# Patient Record
Sex: FEMALE | Race: BLACK OR AFRICAN AMERICAN
[De-identification: names, ages, dates, MRNs, and addresses within clinical notes are randomized per-mention and may not be internally consistent; named-entity substitution may affect disease eponyms.]

---

## 2020-01-29 ENCOUNTER — HOSPITAL ENCOUNTER (INPATIENT)
Dept: HOSPITAL 61 - ER | Age: 50
LOS: 3 days | Discharge: HOME | DRG: 440 | End: 2020-02-01
Attending: FAMILY MEDICINE | Admitting: FAMILY MEDICINE
Payer: SELF-PAY

## 2020-01-29 VITALS — WEIGHT: 228.06 LBS | BODY MASS INDEX: 36.65 KG/M2 | HEIGHT: 66 IN

## 2020-01-29 DIAGNOSIS — I10: ICD-10-CM

## 2020-01-29 DIAGNOSIS — D25.9: ICD-10-CM

## 2020-01-29 DIAGNOSIS — Z86.718: ICD-10-CM

## 2020-01-29 DIAGNOSIS — N92.0: ICD-10-CM

## 2020-01-29 DIAGNOSIS — K85.20: Primary | ICD-10-CM

## 2020-01-29 DIAGNOSIS — E83.42: ICD-10-CM

## 2020-01-29 DIAGNOSIS — K76.0: ICD-10-CM

## 2020-01-29 DIAGNOSIS — Z79.01: ICD-10-CM

## 2020-01-29 DIAGNOSIS — D50.9: ICD-10-CM

## 2020-01-29 LAB
ALBUMIN SERPL-MCNC: 3.7 G/DL (ref 3.4–5)
ALBUMIN/GLOB SERPL: 0.8 {RATIO} (ref 1–1.7)
ALP SERPL-CCNC: 55 U/L (ref 46–116)
ALT SERPL-CCNC: 45 U/L (ref 14–59)
ANION GAP SERPL CALC-SCNC: 13 MMOL/L (ref 6–14)
ANISOCYTOSIS BLD QL SMEAR: (no result)
APTT PPP: YELLOW S
AST SERPL-CCNC: 90 U/L (ref 15–37)
BACTERIA #/AREA URNS HPF: 0 /HPF
BASOPHILS # BLD AUTO: 0.1 X10^3/UL (ref 0–0.2)
BASOPHILS NFR BLD: 1 % (ref 0–3)
BILIRUB SERPL-MCNC: 0.8 MG/DL (ref 0.2–1)
BILIRUB UR QL STRIP: NEGATIVE
BUN SERPL-MCNC: 10 MG/DL (ref 7–20)
BUN/CREAT SERPL: 14 (ref 6–20)
CALCIUM SERPL-MCNC: 9.5 MG/DL (ref 8.5–10.1)
CHLORIDE SERPL-SCNC: 99 MMOL/L (ref 98–107)
CO2 SERPL-SCNC: 24 MMOL/L (ref 21–32)
CREAT SERPL-MCNC: 0.7 MG/DL (ref 0.6–1)
EOSINOPHIL NFR BLD: 0.1 X10^3/UL (ref 0–0.7)
EOSINOPHIL NFR BLD: 1 % (ref 0–3)
ERYTHROCYTE [DISTWIDTH] IN BLOOD BY AUTOMATED COUNT: 20.9 % (ref 11.5–14.5)
FIBRINOGEN PPP-MCNC: CLEAR MG/DL
GFR SERPLBLD BASED ON 1.73 SQ M-ARVRAT: 107.6 ML/MIN
GLOBULIN SER-MCNC: 4.8 G/DL (ref 2.2–3.8)
GLUCOSE SERPL-MCNC: 140 MG/DL (ref 70–99)
HCT VFR BLD CALC: 33.7 % (ref 36–47)
HGB BLD-MCNC: 10.9 G/DL (ref 12–15.5)
HYPOCHROMIA BLD QL SMEAR: (no result)
LYMPHOCYTES # BLD: 0.9 X10^3/UL (ref 1–4.8)
LYMPHOCYTES NFR BLD AUTO: 14 % (ref 24–48)
MCH RBC QN AUTO: 23 PG (ref 25–35)
MCHC RBC AUTO-ENTMCNC: 32 G/DL (ref 31–37)
MCV RBC AUTO: 70 FL (ref 79–100)
MICROCYTES BLD QL SMEAR: (no result)
MONO #: 0.4 X10^3/UL (ref 0–1.1)
MONOCYTES NFR BLD: 6 % (ref 0–9)
NEUT #: 5.1 X10^3/UL (ref 1.8–7.7)
NEUTROPHILS NFR BLD AUTO: 78 % (ref 31–73)
NITRITE UR QL STRIP: NEGATIVE
PH UR STRIP: 6 [PH]
PLATELET # BLD AUTO: 278 X10^3/UL (ref 140–400)
PLATELET # BLD EST: ADEQUATE 10*3/UL
POIKILOCYTOSIS BLD QL SMEAR: SLIGHT
POTASSIUM SERPL-SCNC: 3.5 MMOL/L (ref 3.5–5.1)
PROT SERPL-MCNC: 8.5 G/DL (ref 6.4–8.2)
PROT UR STRIP-MCNC: (no result) MG/DL
RBC # BLD AUTO: 4.78 X10^6/UL (ref 3.5–5.4)
RBC #/AREA URNS HPF: 0 /HPF (ref 0–2)
SODIUM SERPL-SCNC: 136 MMOL/L (ref 136–145)
SQUAMOUS #/AREA URNS LPF: (no result) /LPF
UROBILINOGEN UR-MCNC: 2 MG/DL
WBC # BLD AUTO: 6.6 X10^3/UL (ref 4–11)
WBC #/AREA URNS HPF: (no result) /HPF (ref 0–4)

## 2020-01-29 PROCEDURE — 80053 COMPREHEN METABOLIC PANEL: CPT

## 2020-01-29 PROCEDURE — 82607 VITAMIN B-12: CPT

## 2020-01-29 PROCEDURE — 83550 IRON BINDING TEST: CPT

## 2020-01-29 PROCEDURE — 85025 COMPLETE CBC W/AUTO DIFF WBC: CPT

## 2020-01-29 PROCEDURE — 83735 ASSAY OF MAGNESIUM: CPT

## 2020-01-29 PROCEDURE — 93005 ELECTROCARDIOGRAM TRACING: CPT

## 2020-01-29 PROCEDURE — 83690 ASSAY OF LIPASE: CPT

## 2020-01-29 PROCEDURE — 74177 CT ABD & PELVIS W/CONTRAST: CPT

## 2020-01-29 PROCEDURE — 84484 ASSAY OF TROPONIN QUANT: CPT

## 2020-01-29 PROCEDURE — 76705 ECHO EXAM OF ABDOMEN: CPT

## 2020-01-29 PROCEDURE — 85018 HEMOGLOBIN: CPT

## 2020-01-29 PROCEDURE — 83605 ASSAY OF LACTIC ACID: CPT

## 2020-01-29 PROCEDURE — 82962 GLUCOSE BLOOD TEST: CPT

## 2020-01-29 PROCEDURE — 81001 URINALYSIS AUTO W/SCOPE: CPT

## 2020-01-29 PROCEDURE — 83540 ASSAY OF IRON: CPT

## 2020-01-29 PROCEDURE — 85014 HEMATOCRIT: CPT

## 2020-01-29 PROCEDURE — 84478 ASSAY OF TRIGLYCERIDES: CPT

## 2020-01-29 PROCEDURE — 36415 COLL VENOUS BLD VENIPUNCTURE: CPT

## 2020-01-29 PROCEDURE — G0378 HOSPITAL OBSERVATION PER HR: HCPCS

## 2020-01-29 PROCEDURE — 87086 URINE CULTURE/COLONY COUNT: CPT

## 2020-01-29 NOTE — PHYS DOC
Past Medical History


Past Medical History:  Anemia, DVT, Hypertension, Uterine Fibroids


Past Surgical History:  No Surgical History


Alcohol Use:  Occasionally


Drug Use:  None





Adult General


Chief Complaint


Chief Complaint:  ABDOMINAL PAIN





[facility]


HPI





49-year-old female presents to the emergency department with complaints of abdo

tam pain area patient describes pain onset approximately 3-4 days ago 

worsening today, she describes "excruciating. States the pain started in the 

epigastric region down the middle of her abdomen. She describes nausea as well 

as vomiting. Lasting approximately 3 days ago. Patient denies any bloating or 

distention on examination. She denies any fever or chills.  Patient does have a 

history of hypertension as well as fibroids.  Nothing makes her symptoms worse, 

nothing makes her symptoms better.





Review of Systems


Review of Systems





Constitutional: Denies fever or chills []


Respiratory: Denies cough or shortness of breath []


Cardiovascular: No additional information not addressed in HPI []


GI: + abdominal pain, nausea, vomiting, no bloody stools or diarrhea []


: Denies dysuria or hematuria []


Musculoskeletal: Denies back pain or joint pain []


Neurologic: Denies headache, focal weakness or sensory changes []





All other systems were reviewed and found to be within normal limits, except as 

documented in this note.





Current Medications


Current Medications





Current Medications








 Medications


  (Trade)  Dose


 Ordered  Sig/[facility]  Start Time


 Stop Time Status Last Admin


Dose Admin


 


 Info


  (CONTRAST GIVEN


 -- Rx MONITORING)  1 each  PRN DAILY  PRN  20 23:45


 20 23:44   





 


 Morphine Sulfate


  (Morphine


 Sulfate)  4 mg  1X  ONCE  20 23:30


 20 23:31 DC 20 23:24


4 MG


 


 Ondansetron HCl


  (Zofran)  4 mg  1X  ONCE  20 23:30


 20 23:31 DC 20 23:23


4 MG











Allergies


Allergies





Allergies








Coded Allergies Type Severity Reaction Last Updated Verified


 


  No Known Drug Allergies    14 No











Physical Exam


Physical Exam





Constitutional: Well developed, well nourished, mild distress 2/2 pain, non-

toxic appearance. []


HENT: Normocephalic, atraumatic, bilateral external ears normal, oropharynx 

moist, no oral exudates, nose normal. []


Eyes: PERRLA, EOMI, conjunctiva normal, no discharge. [] 


Cardiovascular:Heart rate regular rhythm, no murmur []


Lungs & Thorax:  Bilateral breath sounds clear to auscultation []


Abdomen: Bowel sounds normal, soft, TTP throughout, no masses, no pulsatile 

masses. [] 


Skin: Warm, dry, no erythema, no rash. [] 


Back: No tenderness, no CVA tenderness. [] 


Extremities: No tenderness, no edema. [] 


Neurologic: Alert and oriented X 3, no focal deficits noted. []


Psychologic: Affect normal, judgement normal, mood normal. []





Current Patient Data


Vital Signs





                                   Vital Signs








  Date Time  Temp Pulse Resp B/P (MAP) Pulse Ox O2 Delivery O2 Flow Rate FiO2


 


20 23:48  70 18 151/69 (96) 96 Room Air  


 


20 22:39 97.0       





 97.0       








Lab Values





                                Laboratory Tests








Test


 20


23:15 20


23:45


 


White Blood Count


 6.6 x10^3/uL


(4.0-11.0) 





 


Red Blood Count


 4.78 x10^6/uL


(3.50-5.40) 





 


Hemoglobin


 10.9 g/dL


(12.0-15.5)  L 





 


Hematocrit


 33.7 %


(36.0-47.0)  L 





 


Mean Corpuscular Volume


 70 fL ()


L 





 


Mean Corpuscular Hemoglobin


 23 pg (25-35)


L 





 


Mean Corpuscular Hemoglobin


Concent 32 g/dL


(31-37) 





 


Red Cell Distribution Width


 20.9 %


(11.5-14.5)  H 





 


Platelet Count


 278 x10^3/uL


(140-400) 





 


Neutrophils (%) (Auto) 78 % (31-73)  H 


 


Lymphocytes (%) (Auto) 14 % (24-48)  L 


 


Monocytes (%) (Auto) 6 % (0-9)   


 


Eosinophils (%) (Auto) 1 % (0-3)   


 


Basophils (%) (Auto) 1 % (0-3)   


 


Neutrophils # (Auto)


 5.1 x10^3/uL


(1.8-7.7) 





 


Lymphocytes # (Auto)


 0.9 x10^3/uL


(1.0-4.8)  L 





 


Monocytes # (Auto)


 0.4 x10^3/uL


(0.0-1.1) 





 


Eosinophils # (Auto)


 0.1 x10^3/uL


(0.0-0.7) 





 


Basophils # (Auto)


 0.1 x10^3/uL


(0.0-0.2) 





 


Platelet Estimate


 Adequate


(ADEQUATE) 





 


Hypochromasia Mod   


 


Poikilocytosis Slight   


 


Anisocytosis Mod   


 


Microcytosis Mod   


 


Urine Collection Type Unknown   


 


Urine Color Yellow   


 


Urine Clarity Clear   


 


Urine pH 6.0   


 


Urine Specific Gravity 1.025   


 


Urine Protein


 Trace mg/dL


(NEG-TRACE) 





 


Urine Glucose (UA)


 Negative mg/dL


(NEG) 





 


Urine Ketones (Stick)


 15 mg/dL (NEG)


 





 


Urine Blood


 Negative (NEG)


 





 


Urine Nitrite


 Negative (NEG)


 





 


Urine Bilirubin


 Negative (NEG)


 





 


Urine Urobilinogen Dipstick


 2.0 mg/dL (0.2


mg/dL) 





 


Urine Leukocyte Esterase Trace (NEG)   


 


Urine RBC 0 /HPF (0-2)   


 


Urine WBC


 Occ /HPF (0-4)


 





 


Urine Squamous Epithelial


Cells Few /LPF  


 





 


Urine Bacteria


 0 /HPF (0-FEW)


 





 


Urine Mucus Mod /LPF   


 


Sodium Level


 136 mmol/L


(136-145) 





 


Potassium Level


 3.5 mmol/L


(3.5-5.1) 





 


Chloride Level


 99 mmol/L


() 





 


Carbon Dioxide Level


 24 mmol/L


(21-32) 





 


Anion Gap 13 (6-14)   


 


Blood Urea Nitrogen


 10 mg/dL


(7-20) 





 


Creatinine


 0.7 mg/dL


(0.6-1.0) 





 


Estimated GFR


(Cockcroft-Gault) 107.6  


 





 


BUN/Creatinine Ratio 14 (6-20)   


 


Glucose Level


 140 mg/dL


(70-99)  H 





 


Calcium Level


 9.5 mg/dL


(8.5-10.1) 





 


Total Bilirubin


 0.8 mg/dL


(0.2-1.0) 





 


Aspartate Amino Transferase


(AST) 90 U/L (15-37)


H 





 


Alanine Aminotransferase (ALT)


 45 U/L (14-59)


 





 


Alkaline Phosphatase


 55 U/L


() 





 


Troponin I Quantitative


 < 0.017 ng/mL


(0.000-0.055) 





 


Total Protein


 8.5 g/dL


(6.4-8.2)  H 





 


Albumin


 3.7 g/dL


(3.4-5.0) 





 


Albumin/Globulin Ratio


 0.8 (1.0-1.7)


L 





 


Lipase


 78223 U/L


()  H 





 


Lactic Acid Level


 


 1.3 mmol/L


(0.4-2.0)





                                Laboratory Tests


20 23:15








                                Laboratory Tests


20 23:15











EKG


EKG


EKG reviewed interpretation time 2251, left axis deviation, normal sinus rhythm 

no evidence of ST elevation[]





Radiology/Procedures


Radiology/Procedures


[facility]


                    [address] [place] [address]


                                 [phone]


                                        


                                 IMAGING REPORT





                                     Signed





PATIENT: LORENE KIRKPATRICK  ACCOUNT: [id number]     MRN#: [id number]


: 1970           LOCATION: [address] [place] [address]         AGE: 49


SEX: F                    EXAM DT: 20         ACCESSION#: [id number]


STATUS: ADM IN            ORD. PHYSICIAN: DANITA GONZALEZ MD


REASON: abdominal pain, non-specific, generalized, OMNI 300, 75 ML IV


PROCEDURE: CT ABD PELV W/ IV CONTRST ONLY





EXAM: CT Abdomen and Pelvis with IV contrast


 


CLINICAL HISTORY: Generalized abdominal pain.


 


COMPARISON: none


 


TECHNIQUE: Helical CT of the abdomen and pelvis was performed following 


the administration of IV contrast. Axial, coronal and sagittal reformatted


images were generated.


 


---PQRS compliance statement - One or more of the following individualized


dose reduction techniques were utilized for this study:


1.  Automated exposure control


2.  Adjustment of the mA and/or kV according to patient size


3.  Use of iterative reconstruction technique---


 


FINDINGS:


Lower chest: Linear opacities in the lower lobes and lingula likely 


scarring/atelectasis.


 


Abdomen and pelvis:


Liver and biliary system: Hepatic hypoattenuation likely fatty liver. 


Regions of low-attenuation along the falciform ligament and segment IVb, 


nonspecific and although may represent superimposed focal fatty 


infiltration, underlying mass is not excluded.  Gallbladder is normal. No 


biliary ductal dilatation.


 


Spleen: Unremarkable


 


Pancreas: Diffuse edema about the pancreatic tail consistent with acute 


pancreatitis. No discrete loculated fluid collection is seen. Symmetric 


homogenous pancreatic enhancement without evidence for ischemia/necrosis.


 


Adrenal glands: Unremarkable


 


Kidneys: Symmetric nephrograms. No focal renal lesion. No hydronephrosis. 


No hydroureter.


 


Lymph nodes/retroperitoneum: No abdominal pelvic lymphadenopathy. 


 


Vessels: Aorta is normal in caliber. Intermittent atherosclerotic 


calcifications are seen. Splenic vein is patent


 


Bowel/Peritoneal cavity: Moderate colonic stool content is seen. Appendix 


is normal. No small or large bowel dilatation. No evidence of bowel 


obstruction.  


 


No abdominal or pelvic ascites. Marked enlargement of the uterus, 


measuring approximately 13.5 x 10.6 x 19.6 cm. This is likely from 


numerous underlying fibroids including exophytic fibroids. The largest 


uterus abuts the adjacent external iliac artery and vein.


 


Abdominal wall: Small fat-containing periumbilical hernia is seen. 


 


Bladder: Bladder is decompressed.


 


Bones: Mild leftward curvature of the lumbar spine.


 


IMPRESSION:


1.  Marked edema about the pancreas consistent with acute pancreatitis. No


CT evidence for pancreatic ischemia/necrosis or associated loculated 


peripancreatic collection.


2.  Diffuse hepatic low-attenuation along the falx from ligament and 


segment IVb, although this may be related to focal fatty infiltration, 


underlying hepatic lesion is not excluded. This can be further delineated 


by ultrasound or multiphase MRI/CT.


3.  Markedly enlarged appearance of the uterus, favored to be sequelae of 


fibroid uterus 


 


Electronically signed by: Jose Angel Hart MD (2020 1:20 AM) [facility]














DICTATED and SIGNED BY:     JOSE ANGEL HART MD


DATE:     20


[]





Course & Med Decision Making


Course & Med Decision Making


Pertinent Labs and Imaging studies reviewed. (See chart for details)





[]49-year-old female presents to the emergency department with complaints of 

abdominal pain area patient describes pain onset approximately 3-4 days ago 

worsening today, she describes "excruciating. States the pain started in the 

epigastric region down the middle of her abdomen. She describes nausea as well 

as vomiting. Lasting approximately 3 days ago. Patient denies any bloating or 

distention on examination. She denies any fever or chills.  Patient does have a 

history of hypertension as well as fibroids.  Nothing makes her symptoms worse, 

nothing makes her symptoms better.  





Labs reviewed/Imaging reviewed


Evidence of acute pancreatitis on labs, lipase 13k


UAD negative for acute process


Morphine 4mg/Zofran 4mg IV 


IVF x 1 liter


Plan admit with further hospitalist evaluation


Discussed findings with patient/family at bedside





Dragon Disclaimer


Dragon Disclaimer


This electronic medical record was generated, in whole or in part, using a voice

 recognition dictation system.





Departure


Departure


Impression:  


   Primary Impression:  


   Acute pancreatitis


   Additional Impression:  


   Nausea & vomiting


Disposition:   ADMITTED AS INPATIENT


Condition:  STABLE


Referrals:  


NO PCP (PCP)


Critical Care Time


 Critical care time was 35 minutes exclusive of procedures.





Problem Qualifiers








   Primary Impression:  


   Acute pancreatitis


   Pancreatitis type:  unspecified pancreatitis type  Acute pancreatitis 

   complication:  unspecified  Qualified Codes:  K85.90 - Acute pancreatitis 

   without necrosis or infection, unspecified


   Additional Impression:  


   Nausea & vomiting


   Vomiting type:  unspecified  Vomiting Intractability:  unspecified  Qualified

    Codes:  R11.2 - Nausea with vomiting, unspecified








DANITA GONZALEZ MD              2020 23:02

## 2020-01-30 VITALS — SYSTOLIC BLOOD PRESSURE: 128 MMHG | DIASTOLIC BLOOD PRESSURE: 77 MMHG

## 2020-01-30 VITALS — SYSTOLIC BLOOD PRESSURE: 150 MMHG | DIASTOLIC BLOOD PRESSURE: 70 MMHG

## 2020-01-30 VITALS — SYSTOLIC BLOOD PRESSURE: 137 MMHG | DIASTOLIC BLOOD PRESSURE: 85 MMHG

## 2020-01-30 VITALS — SYSTOLIC BLOOD PRESSURE: 118 MMHG | DIASTOLIC BLOOD PRESSURE: 74 MMHG

## 2020-01-30 VITALS — DIASTOLIC BLOOD PRESSURE: 75 MMHG | SYSTOLIC BLOOD PRESSURE: 165 MMHG

## 2020-01-30 VITALS — SYSTOLIC BLOOD PRESSURE: 153 MMHG | DIASTOLIC BLOOD PRESSURE: 75 MMHG

## 2020-01-30 RX ADMIN — MORPHINE SULFATE PRN MG: 4 INJECTION, SOLUTION INTRAMUSCULAR; INTRAVENOUS at 17:13

## 2020-01-30 RX ADMIN — FAMOTIDINE SCH MG: 10 INJECTION, SOLUTION INTRAVENOUS at 20:11

## 2020-01-30 RX ADMIN — MORPHINE SULFATE PRN MG: 4 INJECTION, SOLUTION INTRAMUSCULAR; INTRAVENOUS at 07:10

## 2020-01-30 RX ADMIN — MORPHINE SULFATE PRN MG: 4 INJECTION, SOLUTION INTRAMUSCULAR; INTRAVENOUS at 09:52

## 2020-01-30 RX ADMIN — MORPHINE SULFATE PRN MG: 4 INJECTION, SOLUTION INTRAMUSCULAR; INTRAVENOUS at 00:59

## 2020-01-30 RX ADMIN — MORPHINE SULFATE PRN MG: 4 INJECTION, SOLUTION INTRAMUSCULAR; INTRAVENOUS at 13:29

## 2020-01-30 RX ADMIN — MORPHINE SULFATE PRN MG: 2 INJECTION, SOLUTION INTRAMUSCULAR; INTRAVENOUS at 23:43

## 2020-01-30 RX ADMIN — BACITRACIN SCH MLS/HR: 5000 INJECTION, POWDER, FOR SOLUTION INTRAMUSCULAR at 09:52

## 2020-01-30 RX ADMIN — MORPHINE SULFATE PRN MG: 4 INJECTION, SOLUTION INTRAMUSCULAR; INTRAVENOUS at 02:47

## 2020-01-30 RX ADMIN — BACITRACIN SCH MLS/HR: 5000 INJECTION, POWDER, FOR SOLUTION INTRAMUSCULAR at 18:04

## 2020-01-30 RX ADMIN — OXYCODONE HYDROCHLORIDE AND ACETAMINOPHEN PRN TAB: 5; 325 TABLET ORAL at 20:11

## 2020-01-30 NOTE — EKG
[facility]

               [address] [place] [address]

Test Date:    2020               Test Time:    07:53:17

Pat Name:     LORENE KIRKPATRICK            Department:   

Patient ID:   [id number]           Room:         6 

Gender:       F                        Technician:   CORINNE

:          1970               Requested By: TA PHILIP

Order Number: [id number]           Reading MD:     

                                 Measurements

Intervals                              Axis          

Rate:         70                       P:            10

GA:           152                      QRS:          14

QRSD:         84                       T:            9

QT:           434                                    

QTc:          472                                    

                           Interpretive Statements

SINUS RHYTHM

NORMAL ECG

RI6.02

Compared to ECG 2017 16:41:00

Sinus tachycardia no longer present

## 2020-01-30 NOTE — RAD
ABDOMEN LTD

 

History: Pancreatitis

 

Comparison: CT January 29, 2020 

 

Findings:

Multiple sonographic images of the abdomen are submitted. There is mild 

coarsening of the hepatic echotexture. Right lobe of the liver measured 

15.6 cm longitudinal. Common bile duct is within normal limits at 0.3 cm. 

Gallbladder is present without demonstrable intraluminal abnormality, wall

thickening, pericholecystic fluid. There is segmental visualization of the

inferior vena cava although poorly visualized due to bowel gas. Pancreas 

is also not well visualized due to bowel gas especially of the pancreatic 

tail. Right kidney measured 11.2 x 5.7 x 4.4 cm, no hydronephrosis.

 

Impression: 

 

1.  There is coarsening of the hepatic echotexture of the liver likely due

to steatosis. MRI would more definitively exclude infiltrative mass.

2. Pancreas is not well-visualized on this exam, no biliary ductal 

dilatation or gallbladder abnormality demonstrated.

 

Electronically signed by: John Cowart MD (1/30/2020 11:20 AM) 

[facility]

## 2020-01-30 NOTE — PDOC1
History and Physical


Date of Admission


Date of Admission


DATE: 1/30/20 


TIME: 12:57





Identification/Chief Complaint


Chief Complaint


abd pain





Source


Source:  Caregiver, Chart review, Patient





History of Present Illness


History of Present Illness


49 AA female, acute to sub acute worsening/peristent epig pain, hx of 

pancreatitis before, hx etoh, last drink few days ago, etoh levels now < 10,. 

BUT LIPASE 13K with CT abd showing signs of signif pancreatic inflammation./ MAg

also moderately low 1.4, STILL VERY tender on exam, epig area, mild palp, tachy,

but BP good, MCV 70s with anemia, hx fbroids, no home meds no insurance, not 

taking provera anymore


LAST HEAVY MENSTRUAL BLEED was weeks ago, not anymore


NO SOA


GI note reviewed





Past Medical History


Cardiovascular:  HTN


Pulmonary:  No pertinent hx


GI:  No pertinent hx


Heme/Onc:  Anemia NOS


Musculoskeletal:   low back pain, Osteoarthritis, Swelling, Stiffness


Renal/:  Other (fibroid uterus)





Past Surgical History


Past Surgical History:  No pertinent history





Family History


Family History:  No Significant (reviewed)





Social History


Smoke:  No


ALCOHOL:  other (3 beers daily)


Drugs:  Other (+cocaine and cannabinoids in the past)





Current Problem List


Problem List


Problems


Medical Problems:


(1) Acute pancreatitis


Status: Acute  





(2) Nausea & vomiting


Status: Acute  











Current Medications


Current Medications





Current Medications


Morphine Sulfate (Morphine Sulfate) 4 mg 1X  ONCE IV  Last administered on 

1/29/20at 23:24;  Start 1/29/20 at 23:30;  Stop 1/29/20 at 23:31;  Status DC


Ondansetron HCl (Zofran) 4 mg 1X  ONCE IVP  Last administered on 1/29/20at 

23:23;  Start 1/29/20 at 23:30;  Stop 1/29/20 at 23:31;  Status DC


Iohexol (Omnipaque 300 Mg/ml) 75 ml 1X  ONCE IV  Last administered on 1/30/20at 

00:26;  Start 1/30/20 at 00:15;  Stop 1/30/20 at 00:16;  Status DC


Info (CONTRAST GIVEN -- Rx MONITORING) 1 each PRN DAILY  PRN MC SEE COMMENTS;  

Start 1/29/20 at 23:45;  Stop 1/31/20 at 23:44


Ondansetron HCl (Zofran) 4 mg PRN Q8HRS  PRN IV NAUSEA/VOMITING 1ST CHOICE;  

Start 1/30/20 at 00:15;  Stop 1/30/20 at 09:40;  Status DC


Morphine Sulfate (Morphine Sulfate) 4 mg PRN Q2HR  PRN IV SEVERE PAIN 7-10 Last 

administered on 1/30/20at 09:52;  Start 1/30/20 at 00:15;  Stop 1/31/20 at 00:14


Sodium Chloride 1,000 ml @  125 mls/hr 1X  ONCE IV  Last administered on 

1/30/20at 00:40;  Start 1/30/20 at 00:30;  Stop 1/30/20 at 08:29;  Status DC


Lorazepam (Ativan) 4 mg PRN Q1HR  PRN PO For CIWA 8-14;  Start 1/30/20 at 02:30


Lorazepam (Ativan) 8 mg PRN Q1HR  PRN PO For CIWA 15 or greater;  Start 1/30/20 

at 02:30


Lorazepam (Ativan Inj) 2 mg PRN Q1HR  PRN IV For CIWA 8-14;  Start 1/30/20 at 

02:30


Lorazepam (Ativan Inj) 4 mg PRN Q1HR  PRN IV For CIWA 15 or greater;  Start 

1/30/20 at 02:30


Morphine Sulfate (Morphine Sulfate) 2 mg PRN Q2HR  PRN IV PAIN;  Start 1/30/20 

at 09:45


Acetaminophen/ Hydrocodone Bitart (Lortab 5/325) 1 tab PRN Q4HRS  PRN PO PAIN;  

Start 1/30/20 at 09:45


Ondansetron HCl (Zofran) 4 mg PRN Q6HRS  PRN IVP NAUSEA/VOMITING;  Start 1/30/20

at 09:45


Sodium Chloride 1,000 ml @  125 mls/hr Q8H IV  Last administered on 1/30/20at 

09:52;  Start 1/30/20 at 09:45


Ferrous Sulfate (Feosol) 325 mg BIDWMEALS PO ;  Start 1/30/20 at 10:00;  Stop 

1/30/20 at 10:17;  Status DC


Oxycodone/ Acetaminophen (Percocet 5/325) 1 tab PRN Q4HRS  PRN PO PAIN;  Start 

1/30/20 at 09:45


Famotidine (Pepcid Vial) 20 mg QHS IVP ;  Start 1/30/20 at 21:00





Active Scripts


Active


Oxycodone-Acetaminophen 5-325 (Oxycodone Hcl/Acetaminophen) 1 Each Tablet 1 Tab 

PO PRN Q4HRS PRN


Medroxyprogesterone Acetate 2.5 Mg Tablet 10 Mg PO DAILY 15 Days


Levaquin (Levofloxacin) 500 Mg Tablet 500 Mg PO DAILY06


Ferrous Sulfate 325 Mg Tablet 325 Mg PO BID





Allergies


Allergies:  


Coded Allergies:  


     No Known Drug Allergies (Unverified , 9/5/14)





ROS


Review of System


as per HPI< all esle neg 14 pt reviewed with her, POSitive for tenderness, 

nausea,. no fevers





Physical Exam


General:  Alert, Oriented X3, Cooperative, No acute distress


HEENT:  Atraumatic, PERRLA


Lungs:  Clear to auscultation, Normal air movement


Heart:  S1S2, RRR, no thrills, no rubs, no gallops, other (sinus tachy)


Abdomen:  Normal bowel sounds, Soft, Other (tenderness, epig area, no rebound)


Rectal Exam:  not examined


PELVIC:  Nml ext genitalia


Extremities:  No clubbing, No cyanosis, No edema, Normal pulses, No 

tenderness/swelling


Skin:  No rashes, No breakdown, No significant lesion


Neuro:  Normal gait, Normal speech, Strength at 5/5 X4 ext, Normal tone, 

Sensation intact, Cranial nerves 3-12 NL, Reflexes 2+


Psych/Mental Status:  Mental status NL, Mood NL





Vitals


Vitals





Vital Signs








  Date Time  Temp Pulse Resp B/P (MAP) Pulse Ox O2 Delivery O2 Flow Rate FiO2


 


1/30/20 12:09     93 Room Air  


 


1/30/20 10:42 98.1 64 20 153/75 (101)    





 98.1       











Labs


Labs





Laboratory Tests








Test


 1/29/20


23:15 1/29/20


23:45 1/30/20


08:55


 


White Blood Count


 6.6 x10^3/uL


(4.0-11.0) 


 





 


Red Blood Count


 4.78 x10^6/uL


(3.50-5.40) 


 





 


Hemoglobin


 10.9 g/dL


(12.0-15.5) 


 





 


Hematocrit


 33.7 %


(36.0-47.0) 


 





 


Mean Corpuscular Volume 70 fL ()   


 


Mean Corpuscular Hemoglobin 23 pg (25-35)   


 


Mean Corpuscular Hemoglobin


Concent 32 g/dL


(31-37) 


 





 


Red Cell Distribution Width


 20.9 %


(11.5-14.5) 


 





 


Platelet Count


 278 x10^3/uL


(140-400) 


 





 


Neutrophils (%) (Auto) 78 % (31-73)   


 


Lymphocytes (%) (Auto) 14 % (24-48)   


 


Monocytes (%) (Auto) 6 % (0-9)   


 


Eosinophils (%) (Auto) 1 % (0-3)   


 


Basophils (%) (Auto) 1 % (0-3)   


 


Neutrophils # (Auto)


 5.1 x10^3/uL


(1.8-7.7) 


 





 


Lymphocytes # (Auto)


 0.9 x10^3/uL


(1.0-4.8) 


 





 


Monocytes # (Auto)


 0.4 x10^3/uL


(0.0-1.1) 


 





 


Eosinophils # (Auto)


 0.1 x10^3/uL


(0.0-0.7) 


 





 


Basophils # (Auto)


 0.1 x10^3/uL


(0.0-0.2) 


 





 


Platelet Estimate


 Adequate


(ADEQUATE) 


 





 


Hypochromasia Mod   


 


Poikilocytosis Slight   


 


Anisocytosis Mod   


 


Microcytosis Mod   


 


Urine Collection Type Unknown   


 


Urine Color Yellow   


 


Urine Clarity Clear   


 


Urine pH 6.0   


 


Urine Specific Gravity 1.025   


 


Urine Protein


 Trace mg/dL


(NEG-TRACE) 


 





 


Urine Glucose (UA)


 Negative mg/dL


(NEG) 


 





 


Urine Ketones (Stick) 15 mg/dL (NEG)   


 


Urine Blood Negative (NEG)   


 


Urine Nitrite Negative (NEG)   


 


Urine Bilirubin Negative (NEG)   


 


Urine Urobilinogen Dipstick


 2.0 mg/dL (0.2


mg/dL) 


 





 


Urine Leukocyte Esterase Trace (NEG)   


 


Urine RBC 0 /HPF (0-2)   


 


Urine WBC Occ /HPF (0-4)   


 


Urine Squamous Epithelial


Cells Few /LPF 


 


 





 


Urine Bacteria 0 /HPF (0-FEW)   


 


Urine Mucus Mod /LPF   


 


Sodium Level


 136 mmol/L


(136-145) 


 





 


Potassium Level


 3.5 mmol/L


(3.5-5.1) 


 





 


Chloride Level


 99 mmol/L


() 


 





 


Carbon Dioxide Level


 24 mmol/L


(21-32) 


 





 


Anion Gap 13 (6-14)   


 


Blood Urea Nitrogen


 10 mg/dL


(7-20) 


 





 


Creatinine


 0.7 mg/dL


(0.6-1.0) 


 





 


Estimated GFR


(Cockcroft-Gault) 107.6 


 


 





 


BUN/Creatinine Ratio 14 (6-20)   


 


Glucose Level


 140 mg/dL


(70-99) 


 





 


Calcium Level


 9.5 mg/dL


(8.5-10.1) 


 





 


Total Bilirubin


 0.8 mg/dL


(0.2-1.0) 


 





 


Aspartate Amino Transf


(AST/SGOT) 90 U/L (15-37) 


 


 





 


Alanine Aminotransferase


(ALT/SGPT) 45 U/L (14-59) 


 


 





 


Alkaline Phosphatase


 55 U/L


() 


 





 


Troponin I Quantitative


 < 0.017 ng/mL


(0.000-0.055) 


 





 


Total Protein


 8.5 g/dL


(6.4-8.2) 


 





 


Albumin


 3.7 g/dL


(3.4-5.0) 


 





 


Albumin/Globulin Ratio 0.8 (1.0-1.7)   


 


Lipase


 36273 U/L


() 


 





 


Lactic Acid Level


 


 1.3 mmol/L


(0.4-2.0) 





 


Magnesium Level


 


 


 1.4 mg/dL


(1.8-2.4)


 


Iron Level


 


 


 32 ug/dL


()


 


Total Iron Binding Capacity


 


 


 367 ug/dL


(250-450)


 


Iron Saturation   9 % (15-34) 


 


Triglycerides Level


 


 


 39 mg/dL


(0-150)


 


Vitamin B12 Level


 


 


 399 pg/mL


(247-911)


 


Ethyl Alcohol Level


 


 


 < 10 mg/dL


(0-10)








Laboratory Tests








Test


 1/29/20


23:15 1/29/20


23:45 1/30/20


08:55


 


White Blood Count


 6.6 x10^3/uL


(4.0-11.0) 


 





 


Red Blood Count


 4.78 x10^6/uL


(3.50-5.40) 


 





 


Hemoglobin


 10.9 g/dL


(12.0-15.5) 


 





 


Hematocrit


 33.7 %


(36.0-47.0) 


 





 


Mean Corpuscular Volume 70 fL ()   


 


Mean Corpuscular Hemoglobin 23 pg (25-35)   


 


Mean Corpuscular Hemoglobin


Concent 32 g/dL


(31-37) 


 





 


Red Cell Distribution Width


 20.9 %


(11.5-14.5) 


 





 


Platelet Count


 278 x10^3/uL


(140-400) 


 





 


Neutrophils (%) (Auto) 78 % (31-73)   


 


Lymphocytes (%) (Auto) 14 % (24-48)   


 


Monocytes (%) (Auto) 6 % (0-9)   


 


Eosinophils (%) (Auto) 1 % (0-3)   


 


Basophils (%) (Auto) 1 % (0-3)   


 


Neutrophils # (Auto)


 5.1 x10^3/uL


(1.8-7.7) 


 





 


Lymphocytes # (Auto)


 0.9 x10^3/uL


(1.0-4.8) 


 





 


Monocytes # (Auto)


 0.4 x10^3/uL


(0.0-1.1) 


 





 


Eosinophils # (Auto)


 0.1 x10^3/uL


(0.0-0.7) 


 





 


Basophils # (Auto)


 0.1 x10^3/uL


(0.0-0.2) 


 





 


Platelet Estimate


 Adequate


(ADEQUATE) 


 





 


Hypochromasia Mod   


 


Poikilocytosis Slight   


 


Anisocytosis Mod   


 


Microcytosis Mod   


 


Urine Collection Type Unknown   


 


Urine Color Yellow   


 


Urine Clarity Clear   


 


Urine pH 6.0   


 


Urine Specific Gravity 1.025   


 


Urine Protein


 Trace mg/dL


(NEG-TRACE) 


 





 


Urine Glucose (UA)


 Negative mg/dL


(NEG) 


 





 


Urine Ketones (Stick) 15 mg/dL (NEG)   


 


Urine Blood Negative (NEG)   


 


Urine Nitrite Negative (NEG)   


 


Urine Bilirubin Negative (NEG)   


 


Urine Urobilinogen Dipstick


 2.0 mg/dL (0.2


mg/dL) 


 





 


Urine Leukocyte Esterase Trace (NEG)   


 


Urine RBC 0 /HPF (0-2)   


 


Urine WBC Occ /HPF (0-4)   


 


Urine Squamous Epithelial


Cells Few /LPF 


 


 





 


Urine Bacteria 0 /HPF (0-FEW)   


 


Urine Mucus Mod /LPF   


 


Sodium Level


 136 mmol/L


(136-145) 


 





 


Potassium Level


 3.5 mmol/L


(3.5-5.1) 


 





 


Chloride Level


 99 mmol/L


() 


 





 


Carbon Dioxide Level


 24 mmol/L


(21-32) 


 





 


Anion Gap 13 (6-14)   


 


Blood Urea Nitrogen


 10 mg/dL


(7-20) 


 





 


Creatinine


 0.7 mg/dL


(0.6-1.0) 


 





 


Estimated GFR


(Cockcroft-Gault) 107.6 


 


 





 


BUN/Creatinine Ratio 14 (6-20)   


 


Glucose Level


 140 mg/dL


(70-99) 


 





 


Calcium Level


 9.5 mg/dL


(8.5-10.1) 


 





 


Total Bilirubin


 0.8 mg/dL


(0.2-1.0) 


 





 


Aspartate Amino Transf


(AST/SGOT) 90 U/L (15-37) 


 


 





 


Alanine Aminotransferase


(ALT/SGPT) 45 U/L (14-59) 


 


 





 


Alkaline Phosphatase


 55 U/L


() 


 





 


Troponin I Quantitative


 < 0.017 ng/mL


(0.000-0.055) 


 





 


Total Protein


 8.5 g/dL


(6.4-8.2) 


 





 


Albumin


 3.7 g/dL


(3.4-5.0) 


 





 


Albumin/Globulin Ratio 0.8 (1.0-1.7)   


 


Lipase


 72419 U/L


() 


 





 


Lactic Acid Level


 


 1.3 mmol/L


(0.4-2.0) 





 


Magnesium Level


 


 


 1.4 mg/dL


(1.8-2.4)


 


Iron Level


 


 


 32 ug/dL


()


 


Total Iron Binding Capacity


 


 


 367 ug/dL


(250-450)


 


Iron Saturation   9 % (15-34) 


 


Triglycerides Level


 


 


 39 mg/dL


(0-150)


 


Vitamin B12 Level


 


 


 399 pg/mL


(247-911)


 


Ethyl Alcohol Level


 


 


 < 10 mg/dL


(0-10)











VTE Prophylaxis Ordered


VTE Prophylaxis Devices:  Yes


VTE Pharmacological Prophylaxi:  Yes





Assessment/Plan


Assessment/Plan


1. Alcohol related pancreatitis - lipase 13 K


2,. MIcrocytic anemia with hx menorrhagia - not anymore, last bleed weeks ago


3. Fibroid uterus - off provera


4. Hypomagnesemia - 1.4 - replace





PLAN:


WOUld keep NPO, signif high lipase


PAin control


GI consult


PPI ppx 


etoh counselling


[facility] referral -SW


SSI 


Replace mag


MOnitor anemia, WOF worsening


WOf for menstrual bleed


Transfsue if hgb < 7


IVF while NPO, dw RN


recheck lytes, mag tmr











PING PA MD           Jan 30, 2020 13:03

## 2020-01-30 NOTE — RAD
EXAM: CT Abdomen and Pelvis with IV contrast

 

CLINICAL HISTORY: Generalized abdominal pain.

 

COMPARISON: none

 

TECHNIQUE: Helical CT of the abdomen and pelvis was performed following 

the administration of IV contrast. Axial, coronal and sagittal reformatted

images were generated.

 

---PQRS compliance statement - One or more of the following individualized

dose reduction techniques were utilized for this study:

1.  Automated exposure control

2.  Adjustment of the mA and/or kV according to patient size

3.  Use of iterative reconstruction technique---

 

FINDINGS:

Lower chest: Linear opacities in the lower lobes and lingula likely 

scarring/atelectasis.

 

Abdomen and pelvis:

Liver and biliary system: Hepatic hypoattenuation likely fatty liver. 

Regions of low-attenuation along the falciform ligament and segment IVb, 

nonspecific and although may represent superimposed focal fatty 

infiltration, underlying mass is not excluded.  Gallbladder is normal. No 

biliary ductal dilatation.

 

Spleen: Unremarkable

 

Pancreas: Diffuse edema about the pancreatic tail consistent with acute 

pancreatitis. No discrete loculated fluid collection is seen. Symmetric 

homogenous pancreatic enhancement without evidence for ischemia/necrosis.

 

Adrenal glands: Unremarkable

 

Kidneys: Symmetric nephrograms. No focal renal lesion. No hydronephrosis. 

No hydroureter.

 

Lymph nodes/retroperitoneum: No abdominal pelvic lymphadenopathy. 

 

Vessels: Aorta is normal in caliber. Intermittent atherosclerotic 

calcifications are seen. Splenic vein is patent

 

Bowel/Peritoneal cavity: Moderate colonic stool content is seen. Appendix 

is normal. No small or large bowel dilatation. No evidence of bowel 

obstruction.  

 

No abdominal or pelvic ascites. Marked enlargement of the uterus, 

measuring approximately 13.5 x 10.6 x 19.6 cm. This is likely from 

numerous underlying fibroids including exophytic fibroids. The largest 

uterus abuts the adjacent external iliac artery and vein.

 

Abdominal wall: Small fat-containing periumbilical hernia is seen. 

 

Bladder: Bladder is decompressed.

 

Bones: Mild leftward curvature of the lumbar spine.

 

IMPRESSION:

1.  Marked edema about the pancreas consistent with acute pancreatitis. No

CT evidence for pancreatic ischemia/necrosis or associated loculated 

peripancreatic collection.

2.  Diffuse hepatic low-attenuation along the falx from ligament and 

segment IVb, although this may be related to focal fatty infiltration, 

underlying hepatic lesion is not excluded. This can be further delineated 

by ultrasound or multiphase MRI/CT.

3.  Markedly enlarged appearance of the uterus, favored to be sequelae of 

fibroid uterus 

 

Electronically signed by: Jose Angel Hart MD (1/30/2020 1:20 AM) [facility]
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
## 2020-01-30 NOTE — NUR
SS following for discharge planning. SS reviewed pt chart. Pt is self pay pt. [facility] following 
for self pay status. Pt is from home and is currently on room air. PAT team consulted for 
ETOH. SS will continue to follow for discharge planning.

## 2020-01-31 VITALS — DIASTOLIC BLOOD PRESSURE: 77 MMHG | SYSTOLIC BLOOD PRESSURE: 116 MMHG

## 2020-01-31 VITALS — SYSTOLIC BLOOD PRESSURE: 105 MMHG | DIASTOLIC BLOOD PRESSURE: 76 MMHG

## 2020-01-31 VITALS — DIASTOLIC BLOOD PRESSURE: 66 MMHG | SYSTOLIC BLOOD PRESSURE: 109 MMHG

## 2020-01-31 VITALS — DIASTOLIC BLOOD PRESSURE: 75 MMHG | SYSTOLIC BLOOD PRESSURE: 111 MMHG

## 2020-01-31 VITALS — SYSTOLIC BLOOD PRESSURE: 104 MMHG | DIASTOLIC BLOOD PRESSURE: 70 MMHG

## 2020-01-31 VITALS — SYSTOLIC BLOOD PRESSURE: 129 MMHG | DIASTOLIC BLOOD PRESSURE: 73 MMHG

## 2020-01-31 LAB
ALBUMIN SERPL-MCNC: 2.9 G/DL (ref 3.4–5)
ALBUMIN/GLOB SERPL: 0.8 {RATIO} (ref 1–1.7)
ALP SERPL-CCNC: 47 U/L (ref 46–116)
ALT SERPL-CCNC: 30 U/L (ref 14–59)
ANION GAP SERPL CALC-SCNC: 15 MMOL/L (ref 6–14)
AST SERPL-CCNC: 45 U/L (ref 15–37)
BASOPHILS # BLD AUTO: 0 X10^3/UL (ref 0–0.2)
BASOPHILS NFR BLD: 1 % (ref 0–3)
BILIRUB SERPL-MCNC: 0.9 MG/DL (ref 0.2–1)
BUN SERPL-MCNC: 9 MG/DL (ref 7–20)
BUN/CREAT SERPL: 13 (ref 6–20)
CALCIUM SERPL-MCNC: 8.3 MG/DL (ref 8.5–10.1)
CHLORIDE SERPL-SCNC: 103 MMOL/L (ref 98–107)
CO2 SERPL-SCNC: 21 MMOL/L (ref 21–32)
CREAT SERPL-MCNC: 0.7 MG/DL (ref 0.6–1)
EOSINOPHIL NFR BLD: 0.1 X10^3/UL (ref 0–0.7)
EOSINOPHIL NFR BLD: 2 % (ref 0–3)
ERYTHROCYTE [DISTWIDTH] IN BLOOD BY AUTOMATED COUNT: 21.5 % (ref 11.5–14.5)
GFR SERPLBLD BASED ON 1.73 SQ M-ARVRAT: 107.6 ML/MIN
GLOBULIN SER-MCNC: 3.8 G/DL (ref 2.2–3.8)
GLUCOSE SERPL-MCNC: 79 MG/DL (ref 70–99)
HCT VFR BLD CALC: 29.9 % (ref 36–47)
HGB BLD-MCNC: 9.6 G/DL (ref 12–15.5)
LYMPHOCYTES # BLD: 0.8 X10^3/UL (ref 1–4.8)
LYMPHOCYTES NFR BLD AUTO: 14 % (ref 24–48)
MCH RBC QN AUTO: 23 PG (ref 25–35)
MCHC RBC AUTO-ENTMCNC: 32 G/DL (ref 31–37)
MCV RBC AUTO: 72 FL (ref 79–100)
MONO #: 0.6 X10^3/UL (ref 0–1.1)
MONOCYTES NFR BLD: 10 % (ref 0–9)
NEUT #: 4.4 X10^3/UL (ref 1.8–7.7)
NEUTROPHILS NFR BLD AUTO: 74 % (ref 31–73)
PLATELET # BLD AUTO: 239 X10^3/UL (ref 140–400)
POTASSIUM SERPL-SCNC: 3.8 MMOL/L (ref 3.5–5.1)
PROT SERPL-MCNC: 6.7 G/DL (ref 6.4–8.2)
RBC # BLD AUTO: 4.14 X10^6/UL (ref 3.5–5.4)
SODIUM SERPL-SCNC: 139 MMOL/L (ref 136–145)
WBC # BLD AUTO: 5.9 X10^3/UL (ref 4–11)

## 2020-01-31 RX ADMIN — BACITRACIN SCH MLS/HR: 5000 INJECTION, POWDER, FOR SOLUTION INTRAMUSCULAR at 01:46

## 2020-01-31 RX ADMIN — BACITRACIN SCH MLS/HR: 5000 INJECTION, POWDER, FOR SOLUTION INTRAMUSCULAR at 08:41

## 2020-01-31 RX ADMIN — OXYCODONE HYDROCHLORIDE AND ACETAMINOPHEN PRN TAB: 5; 325 TABLET ORAL at 08:39

## 2020-01-31 RX ADMIN — OXYCODONE HYDROCHLORIDE AND ACETAMINOPHEN PRN TAB: 5; 325 TABLET ORAL at 01:47

## 2020-01-31 RX ADMIN — BACITRACIN SCH MLS/HR: 5000 INJECTION, POWDER, FOR SOLUTION INTRAMUSCULAR at 21:09

## 2020-01-31 RX ADMIN — FAMOTIDINE SCH MG: 10 INJECTION, SOLUTION INTRAVENOUS at 21:09

## 2020-01-31 RX ADMIN — MORPHINE SULFATE PRN MG: 2 INJECTION, SOLUTION INTRAMUSCULAR; INTRAVENOUS at 11:48

## 2020-01-31 RX ADMIN — MORPHINE SULFATE PRN MG: 2 INJECTION, SOLUTION INTRAMUSCULAR; INTRAVENOUS at 05:57

## 2020-01-31 RX ADMIN — OXYCODONE HYDROCHLORIDE AND ACETAMINOPHEN PRN TAB: 5; 325 TABLET ORAL at 17:23

## 2020-01-31 RX ADMIN — OXYCODONE HYDROCHLORIDE AND ACETAMINOPHEN PRN TAB: 5; 325 TABLET ORAL at 13:29

## 2020-01-31 RX ADMIN — OXYCODONE HYDROCHLORIDE AND ACETAMINOPHEN PRN TAB: 5; 325 TABLET ORAL at 21:18

## 2020-01-31 NOTE — PDOC
Subjective:


Subjective:


Feels little better.


Would like to eat.


Hoping to go home by tomorrow, wants to watch the Super Bowl at home.





Objective:


Vital Signs:





                                   Vital Signs








  Date Time  Temp Pulse Resp B/P (MAP) Pulse Ox O2 Delivery O2 Flow Rate FiO2


 


1/31/20 08:39   19  92 Room Air  


 


1/31/20 07:28 98.4 83  129/73 (91)    





 98.4       








Labs:





Laboratory Tests








Test


 1/30/20


22:47 1/31/20


06:19


 


Glucose (Fingerstick) 124 mg/dL  


 


White Blood Count  5.9 x10^3/uL 


 


Red Blood Count  4.14 x10^6/uL 


 


Hemoglobin  9.6 g/dL 


 


Hematocrit  29.9 % 


 


Mean Corpuscular Volume  72 fL 


 


Mean Corpuscular Hemoglobin  23 pg 


 


Mean Corpuscular Hemoglobin


Concent 


 32 g/dL 





 


Red Cell Distribution Width  21.5 % 


 


Platelet Count  239 x10^3/uL 


 


Neutrophils (%) (Auto)  74 % 


 


Lymphocytes (%) (Auto)  14 % 


 


Monocytes (%) (Auto)  10 % 


 


Eosinophils (%) (Auto)  2 % 


 


Basophils (%) (Auto)  1 % 


 


Neutrophils # (Auto)  4.4 x10^3/uL 


 


Lymphocytes # (Auto)  0.8 x10^3/uL 


 


Monocytes # (Auto)  0.6 x10^3/uL 


 


Eosinophils # (Auto)  0.1 x10^3/uL 


 


Basophils # (Auto)  0.0 x10^3/uL 


 


Sodium Level  139 mmol/L 


 


Potassium Level  3.8 mmol/L 


 


Chloride Level  103 mmol/L 


 


Carbon Dioxide Level  21 mmol/L 


 


Anion Gap  15 


 


Blood Urea Nitrogen  9 mg/dL 


 


Creatinine  0.7 mg/dL 


 


Estimated GFR


(Cockcroft-Gault) 


 107.6 





 


BUN/Creatinine Ratio  13 


 


Glucose Level  79 mg/dL 


 


Calcium Level  8.3 mg/dL 


 


Total Bilirubin  0.9 mg/dL 


 


Aspartate Amino Transf


(AST/SGOT) 


 45 U/L 





 


Alanine Aminotransferase


(ALT/SGPT) 


 30 U/L 





 


Alkaline Phosphatase  47 U/L 


 


Total Protein  6.7 g/dL 


 


Albumin  2.9 g/dL 


 


Albumin/Globulin Ratio  0.8 


 


Lipase  1620 U/L 








Imaging:


RUQ US


Findings:


Multiple sonographic images of the abdomen are submitted. There is mild 

coarsening of the hepatic echotexture. Right lobe of the liver measured 15.6 cm 

longitudinal. Common bile duct is within normal limits at 0.3 cm. Gallbladder is

present without demonstrable intraluminal abnormality, wall


thickening, pericholecystic fluid. There is segmental visualization of the 

inferior vena cava although poorly visualized due to bowel gas. Pancreas is also

not well visualized due to bowel gas especially of the pancreatic tail. Right 

kidney measured 11.2 x 5.7 x 4.4 cm, no hydronephrosis.


Impression: 


1.  There is coarsening of the hepatic echotexture of the liver likely due to 

steatosis. MRI would more definitively exclude infiltrative mass.


2. Pancreas is not well-visualized on this exam, no biliary ductal dilatation or

gallbladder abnormality demonstrated.





PE:





GEN: NAD


LUNGS: CTAB


HEART: RRR - note some tachycardia in chart


ABD: quiet, much less tender


NEURO/PSYCH: A & O 3





A/P:


Pancreatitis - first occurrence, likely related to alcohol - no gallstones, 

trigs WNL


KENZIE, fibroids, menorrhagia


Likely hepatic steatosis





--


Cautiously try clears, ADAT.


?iron infusion - doesn't like PO iron


Needs GYN follow-up and outpt 'scopes after hysterectomy.


Stop drinking.





Hemodynamically unstable?:  No


Is patient in severe pain?:  No


Is NPO status required?:  No











NAV ABBASI         Jan 31, 2020 09:52

## 2020-01-31 NOTE — NUR
SS following up with discharge planning. Zach from the PAT team met with pt and provided 
resources for outpatient services. Pt cleared by PAT team.

## 2020-01-31 NOTE — PDOC
PROGRESS NOTES


Chief Complaint


Chief Complaint


1. Alcohol related pancreatitis - lipase 13 K on admit 


2,. MIcrocytic anemia with hx menorrhagia - not anymore, last bleed weeks ago 

off provera


3. Fibroid uterus - off provera


4. Hypomagnesemia - 1.4 - replaced





History of Present Illness


History of Present Illness


still some epig pain on palpation but better


LIpase down to 1620 from 13K on admit


IVF running fast, CT supports inflammed pancreas





PLAn:


MAy start liq diet


KEep current IVF rate


PAin meds PO on file


MAy dc tele


Agree with GI note





Vitals


Vitals





Vital Signs








  Date Time  Temp Pulse Resp B/P (MAP) Pulse Ox O2 Delivery O2 Flow Rate FiO2


 


1/31/20 10:58 98.5 96 18 111/75 (87) 93 Room Air  





 98.5       











Physical Exam


General:  Alert, Oriented X3, Cooperative, No acute distress


Heart:  Regular rate, Normal S1, Normal S2


Lungs:  Clear


Abdomen:  Normal bowel sounds, Soft, Other (tenderness, epig area, no rebound)


Extremities:  No clubbing, No cyanosis, No edema, Normal pulses, No 

tenderness/swelling


Skin:  No rashes, No breakdown, No significant lesion





Labs


LABS





Laboratory Tests








Test


 1/30/20


22:47 1/31/20


06:19


 


Glucose (Fingerstick)


 124 mg/dL


(70-99) 





 


White Blood Count


 


 5.9 x10^3/uL


(4.0-11.0)


 


Red Blood Count


 


 4.14 x10^6/uL


(3.50-5.40)


 


Hemoglobin


 


 9.6 g/dL


(12.0-15.5)


 


Hematocrit


 


 29.9 %


(36.0-47.0)


 


Mean Corpuscular Volume  72 fL () 


 


Mean Corpuscular Hemoglobin  23 pg (25-35) 


 


Mean Corpuscular Hemoglobin


Concent 


 32 g/dL


(31-37)


 


Red Cell Distribution Width


 


 21.5 %


(11.5-14.5)


 


Platelet Count


 


 239 x10^3/uL


(140-400)


 


Neutrophils (%) (Auto)  74 % (31-73) 


 


Lymphocytes (%) (Auto)  14 % (24-48) 


 


Monocytes (%) (Auto)  10 % (0-9) 


 


Eosinophils (%) (Auto)  2 % (0-3) 


 


Basophils (%) (Auto)  1 % (0-3) 


 


Neutrophils # (Auto)


 


 4.4 x10^3/uL


(1.8-7.7)


 


Lymphocytes # (Auto)


 


 0.8 x10^3/uL


(1.0-4.8)


 


Monocytes # (Auto)


 


 0.6 x10^3/uL


(0.0-1.1)


 


Eosinophils # (Auto)


 


 0.1 x10^3/uL


(0.0-0.7)


 


Basophils # (Auto)


 


 0.0 x10^3/uL


(0.0-0.2)


 


Sodium Level


 


 139 mmol/L


(136-145)


 


Potassium Level


 


 3.8 mmol/L


(3.5-5.1)


 


Chloride Level


 


 103 mmol/L


()


 


Carbon Dioxide Level


 


 21 mmol/L


(21-32)


 


Anion Gap  15 (6-14) 


 


Blood Urea Nitrogen  9 mg/dL (7-20) 


 


Creatinine


 


 0.7 mg/dL


(0.6-1.0)


 


Estimated GFR


(Cockcroft-Gault) 


 107.6 





 


BUN/Creatinine Ratio  13 (6-20) 


 


Glucose Level


 


 79 mg/dL


(70-99)


 


Calcium Level


 


 8.3 mg/dL


(8.5-10.1)


 


Total Bilirubin


 


 0.9 mg/dL


(0.2-1.0)


 


Aspartate Amino Transf


(AST/SGOT) 


 45 U/L (15-37) 





 


Alanine Aminotransferase


(ALT/SGPT) 


 30 U/L (14-59) 





 


Alkaline Phosphatase


 


 47 U/L


()


 


Total Protein


 


 6.7 g/dL


(6.4-8.2)


 


Albumin


 


 2.9 g/dL


(3.4-5.0)


 


Albumin/Globulin Ratio  0.8 (1.0-1.7) 


 


Lipase


 


 1620 U/L


()











Review of Systems


Review of Systems


epigastric pain, no emesis, no fevers, the rest 14 pt neg reviewed with her





Assessment and Plan


Assessmemt and Plan


Problems


Medical Problems:


(1) Acute pancreatitis


Status: Acute  





(2) Nausea & vomiting


Status: Acute  











Comment


Review of Relevant


I have reviewed the following items rosa maria (where applicable) has been applied.


Labs





Laboratory Tests








Test


 1/29/20


23:15 1/29/20


23:45 1/30/20


08:55 1/30/20


22:47


 


White Blood Count


 6.6 x10^3/uL


(4.0-11.0) 


 


 





 


Red Blood Count


 4.78 x10^6/uL


(3.50-5.40) 


 


 





 


Hemoglobin


 10.9 g/dL


(12.0-15.5) 


 


 





 


Hematocrit


 33.7 %


(36.0-47.0) 


 


 





 


Mean Corpuscular Volume 70 fL ()    


 


Mean Corpuscular Hemoglobin 23 pg (25-35)    


 


Mean Corpuscular Hemoglobin


Concent 32 g/dL


(31-37) 


 


 





 


Red Cell Distribution Width


 20.9 %


(11.5-14.5) 


 


 





 


Platelet Count


 278 x10^3/uL


(140-400) 


 


 





 


Neutrophils (%) (Auto) 78 % (31-73)    


 


Lymphocytes (%) (Auto) 14 % (24-48)    


 


Monocytes (%) (Auto) 6 % (0-9)    


 


Eosinophils (%) (Auto) 1 % (0-3)    


 


Basophils (%) (Auto) 1 % (0-3)    


 


Neutrophils # (Auto)


 5.1 x10^3/uL


(1.8-7.7) 


 


 





 


Lymphocytes # (Auto)


 0.9 x10^3/uL


(1.0-4.8) 


 


 





 


Monocytes # (Auto)


 0.4 x10^3/uL


(0.0-1.1) 


 


 





 


Eosinophils # (Auto)


 0.1 x10^3/uL


(0.0-0.7) 


 


 





 


Basophils # (Auto)


 0.1 x10^3/uL


(0.0-0.2) 


 


 





 


Platelet Estimate


 Adequate


(ADEQUATE) 


 


 





 


Hypochromasia Mod    


 


Poikilocytosis Slight    


 


Anisocytosis Mod    


 


Microcytosis Mod    


 


Urine Collection Type Unknown    


 


Urine Color Yellow    


 


Urine Clarity Clear    


 


Urine pH 6.0    


 


Urine Specific Gravity 1.025    


 


Urine Protein


 Trace mg/dL


(NEG-TRACE) 


 


 





 


Urine Glucose (UA)


 Negative mg/dL


(NEG) 


 


 





 


Urine Ketones (Stick) 15 mg/dL (NEG)    


 


Urine Blood Negative (NEG)    


 


Urine Nitrite Negative (NEG)    


 


Urine Bilirubin Negative (NEG)    


 


Urine Urobilinogen Dipstick


 2.0 mg/dL (0.2


mg/dL) 


 


 





 


Urine Leukocyte Esterase Trace (NEG)    


 


Urine RBC 0 /HPF (0-2)    


 


Urine WBC Occ /HPF (0-4)    


 


Urine Squamous Epithelial


Cells Few /LPF 


 


 


 





 


Urine Bacteria 0 /HPF (0-FEW)    


 


Urine Mucus Mod /LPF    


 


Sodium Level


 136 mmol/L


(136-145) 


 


 





 


Potassium Level


 3.5 mmol/L


(3.5-5.1) 


 


 





 


Chloride Level


 99 mmol/L


() 


 


 





 


Carbon Dioxide Level


 24 mmol/L


(21-32) 


 


 





 


Anion Gap 13 (6-14)    


 


Blood Urea Nitrogen


 10 mg/dL


(7-20) 


 


 





 


Creatinine


 0.7 mg/dL


(0.6-1.0) 


 


 





 


Estimated GFR


(Cockcroft-Gault) 107.6 


 


 


 





 


BUN/Creatinine Ratio 14 (6-20)    


 


Glucose Level


 140 mg/dL


(70-99) 


 


 





 


Calcium Level


 9.5 mg/dL


(8.5-10.1) 


 


 





 


Total Bilirubin


 0.8 mg/dL


(0.2-1.0) 


 


 





 


Aspartate Amino Transf


(AST/SGOT) 90 U/L (15-37) 


 


 


 





 


Alanine Aminotransferase


(ALT/SGPT) 45 U/L (14-59) 


 


 


 





 


Alkaline Phosphatase


 55 U/L


() 


 


 





 


Troponin I Quantitative


 < 0.017 ng/mL


(0.000-0.055) 


 


 





 


Total Protein


 8.5 g/dL


(6.4-8.2) 


 


 





 


Albumin


 3.7 g/dL


(3.4-5.0) 


 


 





 


Albumin/Globulin Ratio 0.8 (1.0-1.7)    


 


Lipase


 49626 U/L


() 


 


 





 


Lactic Acid Level


 


 1.3 mmol/L


(0.4-2.0) 


 





 


Magnesium Level


 


 


 1.4 mg/dL


(1.8-2.4) 





 


Iron Level


 


 


 32 ug/dL


() 





 


Total Iron Binding Capacity


 


 


 367 ug/dL


(250-450) 





 


Iron Saturation   9 % (15-34)  


 


Triglycerides Level


 


 


 39 mg/dL


(0-150) 





 


Vitamin B12 Level


 


 


 399 pg/mL


(247-911) 





 


Ethyl Alcohol Level


 


 


 < 10 mg/dL


(0-10) 





 


Glucose (Fingerstick)


 


 


 


 124 mg/dL


(70-99)


 


Test


 1/31/20


06:19 


 


 





 


White Blood Count


 5.9 x10^3/uL


(4.0-11.0) 


 


 





 


Red Blood Count


 4.14 x10^6/uL


(3.50-5.40) 


 


 





 


Hemoglobin


 9.6 g/dL


(12.0-15.5) 


 


 





 


Hematocrit


 29.9 %


(36.0-47.0) 


 


 





 


Mean Corpuscular Volume 72 fL ()    


 


Mean Corpuscular Hemoglobin 23 pg (25-35)    


 


Mean Corpuscular Hemoglobin


Concent 32 g/dL


(31-37) 


 


 





 


Red Cell Distribution Width


 21.5 %


(11.5-14.5) 


 


 





 


Platelet Count


 239 x10^3/uL


(140-400) 


 


 





 


Neutrophils (%) (Auto) 74 % (31-73)    


 


Lymphocytes (%) (Auto) 14 % (24-48)    


 


Monocytes (%) (Auto) 10 % (0-9)    


 


Eosinophils (%) (Auto) 2 % (0-3)    


 


Basophils (%) (Auto) 1 % (0-3)    


 


Neutrophils # (Auto)


 4.4 x10^3/uL


(1.8-7.7) 


 


 





 


Lymphocytes # (Auto)


 0.8 x10^3/uL


(1.0-4.8) 


 


 





 


Monocytes # (Auto)


 0.6 x10^3/uL


(0.0-1.1) 


 


 





 


Eosinophils # (Auto)


 0.1 x10^3/uL


(0.0-0.7) 


 


 





 


Basophils # (Auto)


 0.0 x10^3/uL


(0.0-0.2) 


 


 





 


Sodium Level


 139 mmol/L


(136-145) 


 


 





 


Potassium Level


 3.8 mmol/L


(3.5-5.1) 


 


 





 


Chloride Level


 103 mmol/L


() 


 


 





 


Carbon Dioxide Level


 21 mmol/L


(21-32) 


 


 





 


Anion Gap 15 (6-14)    


 


Blood Urea Nitrogen 9 mg/dL (7-20)    


 


Creatinine


 0.7 mg/dL


(0.6-1.0) 


 


 





 


Estimated GFR


(Cockcroft-Gault) 107.6 


 


 


 





 


BUN/Creatinine Ratio 13 (6-20)    


 


Glucose Level


 79 mg/dL


(70-99) 


 


 





 


Calcium Level


 8.3 mg/dL


(8.5-10.1) 


 


 





 


Total Bilirubin


 0.9 mg/dL


(0.2-1.0) 


 


 





 


Aspartate Amino Transf


(AST/SGOT) 45 U/L (15-37) 


 


 


 





 


Alanine Aminotransferase


(ALT/SGPT) 30 U/L (14-59) 


 


 


 





 


Alkaline Phosphatase


 47 U/L


() 


 


 





 


Total Protein


 6.7 g/dL


(6.4-8.2) 


 


 





 


Albumin


 2.9 g/dL


(3.4-5.0) 


 


 





 


Albumin/Globulin Ratio 0.8 (1.0-1.7)    


 


Lipase


 1620 U/L


() 


 


 











Laboratory Tests








Test


 1/30/20


22:47 1/31/20


06:19


 


Glucose (Fingerstick)


 124 mg/dL


(70-99) 





 


White Blood Count


 


 5.9 x10^3/uL


(4.0-11.0)


 


Red Blood Count


 


 4.14 x10^6/uL


(3.50-5.40)


 


Hemoglobin


 


 9.6 g/dL


(12.0-15.5)


 


Hematocrit


 


 29.9 %


(36.0-47.0)


 


Mean Corpuscular Volume  72 fL () 


 


Mean Corpuscular Hemoglobin  23 pg (25-35) 


 


Mean Corpuscular Hemoglobin


Concent 


 32 g/dL


(31-37)


 


Red Cell Distribution Width


 


 21.5 %


(11.5-14.5)


 


Platelet Count


 


 239 x10^3/uL


(140-400)


 


Neutrophils (%) (Auto)  74 % (31-73) 


 


Lymphocytes (%) (Auto)  14 % (24-48) 


 


Monocytes (%) (Auto)  10 % (0-9) 


 


Eosinophils (%) (Auto)  2 % (0-3) 


 


Basophils (%) (Auto)  1 % (0-3) 


 


Neutrophils # (Auto)


 


 4.4 x10^3/uL


(1.8-7.7)


 


Lymphocytes # (Auto)


 


 0.8 x10^3/uL


(1.0-4.8)


 


Monocytes # (Auto)


 


 0.6 x10^3/uL


(0.0-1.1)


 


Eosinophils # (Auto)


 


 0.1 x10^3/uL


(0.0-0.7)


 


Basophils # (Auto)


 


 0.0 x10^3/uL


(0.0-0.2)


 


Sodium Level


 


 139 mmol/L


(136-145)


 


Potassium Level


 


 3.8 mmol/L


(3.5-5.1)


 


Chloride Level


 


 103 mmol/L


()


 


Carbon Dioxide Level


 


 21 mmol/L


(21-32)


 


Anion Gap  15 (6-14) 


 


Blood Urea Nitrogen  9 mg/dL (7-20) 


 


Creatinine


 


 0.7 mg/dL


(0.6-1.0)


 


Estimated GFR


(Cockcroft-Gault) 


 107.6 





 


BUN/Creatinine Ratio  13 (6-20) 


 


Glucose Level


 


 79 mg/dL


(70-99)


 


Calcium Level


 


 8.3 mg/dL


(8.5-10.1)


 


Total Bilirubin


 


 0.9 mg/dL


(0.2-1.0)


 


Aspartate Amino Transf


(AST/SGOT) 


 45 U/L (15-37) 





 


Alanine Aminotransferase


(ALT/SGPT) 


 30 U/L (14-59) 





 


Alkaline Phosphatase


 


 47 U/L


()


 


Total Protein


 


 6.7 g/dL


(6.4-8.2)


 


Albumin


 


 2.9 g/dL


(3.4-5.0)


 


Albumin/Globulin Ratio  0.8 (1.0-1.7) 


 


Lipase


 


 1620 U/L


()








Medications





Current Medications


Morphine Sulfate (Morphine Sulfate) 4 mg 1X  ONCE IV  Last administered on 

1/29/20at 23:24;  Start 1/29/20 at 23:30;  Stop 1/29/20 at 23:31;  Status DC


Ondansetron HCl (Zofran) 4 mg 1X  ONCE IVP  Last administered on 1/29/20at 

23:23;  Start 1/29/20 at 23:30;  Stop 1/29/20 at 23:31;  Status DC


Iohexol (Omnipaque 300 Mg/ml) 75 ml 1X  ONCE IV  Last administered on 1/30/20at 

00:26;  Start 1/30/20 at 00:15;  Stop 1/30/20 at 00:16;  Status DC


Info (CONTRAST GIVEN -- Rx MONITORING) 1 each PRN DAILY  PRN MC SEE COMMENTS;  

Start 1/29/20 at 23:45;  Stop 1/31/20 at 23:44


Ondansetron HCl (Zofran) 4 mg PRN Q8HRS  PRN IV NAUSEA/VOMITING 1ST CHOICE;  

Start 1/30/20 at 00:15;  Stop 1/30/20 at 09:40;  Status DC


Morphine Sulfate (Morphine Sulfate) 4 mg PRN Q2HR  PRN IV SEVERE PAIN 7-10 Last 

administered on 1/30/20at 17:13;  Start 1/30/20 at 00:15;  Stop 1/31/20 at 

00:14;  Status DC


Sodium Chloride 1,000 ml @  125 mls/hr 1X  ONCE IV  Last administered on 

1/30/20at 00:40;  Start 1/30/20 at 00:30;  Stop 1/30/20 at 08:29;  Status DC


Lorazepam (Ativan) 4 mg PRN Q1HR  PRN PO For CIWA 8-14;  Start 1/30/20 at 02:30


Lorazepam (Ativan) 8 mg PRN Q1HR  PRN PO For CIWA 15 or greater;  Start 1/30/20 

at 02:30


Lorazepam (Ativan Inj) 2 mg PRN Q1HR  PRN IV For CIWA 8-14;  Start 1/30/20 at 

02:30


Lorazepam (Ativan Inj) 4 mg PRN Q1HR  PRN IV For CIWA 15 or greater;  Start 

1/30/20 at 02:30


Morphine Sulfate (Morphine Sulfate) 2 mg PRN Q2HR  PRN IV PAIN Last administered

on 1/31/20at 05:57;  Start 1/30/20 at 09:45


Acetaminophen/ Hydrocodone Bitart (Lortab 5/325) 1 tab PRN Q4HRS  PRN PO PAIN;  

Start 1/30/20 at 09:45


Ondansetron HCl (Zofran) 4 mg PRN Q6HRS  PRN IVP NAUSEA/VOMITING;  Start 1/30/20

at 09:45


Sodium Chloride 1,000 ml @  125 mls/hr Q8H IV  Last administered on 1/31/20at 

08:41;  Start 1/30/20 at 09:45


Ferrous Sulfate (Feosol) 325 mg BIDWMEALS PO ;  Start 1/30/20 at 10:00;  Stop 

1/30/20 at 10:17;  Status DC


Oxycodone/ Acetaminophen (Percocet 5/325) 1 tab PRN Q4HRS  PRN PO PAIN Last 

administered on 1/31/20at 08:39;  Start 1/30/20 at 09:45


Famotidine (Pepcid Vial) 20 mg QHS IVP  Last administered on 1/30/20at 20:11;  

Start 1/30/20 at 21:00





Active Scripts


Active


Oxycodone-Acetaminophen 5-325 (Oxycodone Hcl/Acetaminophen) 1 Each Tablet 1 Tab 

PO PRN Q4HRS PRN


Medroxyprogesterone Acetate 2.5 Mg Tablet 10 Mg PO DAILY 15 Days


Levaquin (Levofloxacin) 500 Mg Tablet 500 Mg PO DAILY06


Ferrous Sulfate 325 Mg Tablet 325 Mg PO BID


Vitals/I & O





Vital Sign - Last 24 Hours








 1/30/20 1/30/20 1/30/20 1/30/20





 12:09 13:29 14:11 14:41


 


Temp    98.0





    98.0


 


Pulse    85


 


Resp    20


 


B/P (MAP)    118/74 (89)


 


Pulse Ox 93 93 93 89


 


O2 Delivery Room Air Room Air Room Air Room Air


 


    





    





 1/30/20 1/30/20 1/30/20 1/30/20





 17:13 17:53 19:05 20:10


 


Temp   97.9 





   97.9 


 


Pulse   93 


 


Resp   20 


 


B/P (MAP)   128/77 (94) 


 


Pulse Ox 89 89 95 


 


O2 Delivery Room Air Room Air Room Air Room Air


 


    





    





 1/30/20 1/31/20 1/31/20 1/31/20





 23:19 03:38 07:28 08:00


 


Temp 98.8 98.7 98.4 





 98.8 98.7 98.4 


 


Pulse 93 110 83 


 


Resp 20 16 16 


 


B/P (MAP) 137/85 (102) 116/77 (90) 129/73 (91) 


 


Pulse Ox 97 98 92 


 


O2 Delivery Room Air Room Air Room Air Room Air


 


    





    





 1/31/20 1/31/20  





 08:39 10:58  


 


Temp  98.5  





  98.5  


 


Pulse  96  


 


Resp 19 18  


 


B/P (MAP)  111/75 (87)  


 


Pulse Ox 92 93  


 


O2 Delivery Room Air Room Air  











Hemodynamically unstable?:  No


Is patient in severe pain?:  No


Is NPO status required?:  No











PING PA MD           Jan 31, 2020 11:17

## 2020-02-01 VITALS — DIASTOLIC BLOOD PRESSURE: 78 MMHG | SYSTOLIC BLOOD PRESSURE: 105 MMHG

## 2020-02-01 VITALS — DIASTOLIC BLOOD PRESSURE: 91 MMHG | SYSTOLIC BLOOD PRESSURE: 145 MMHG

## 2020-02-01 VITALS — DIASTOLIC BLOOD PRESSURE: 84 MMHG | SYSTOLIC BLOOD PRESSURE: 127 MMHG

## 2020-02-01 LAB
HCT VFR BLD CALC: 29.5 % (ref 36–47)
HGB BLD-MCNC: 9.4 G/DL (ref 12–15.5)
MCHC RBC AUTO-ENTMCNC: 32 G/DL (ref 31–37)

## 2020-02-01 RX ADMIN — OXYCODONE HYDROCHLORIDE AND ACETAMINOPHEN PRN TAB: 5; 325 TABLET ORAL at 01:25

## 2020-02-01 RX ADMIN — BACITRACIN SCH MLS/HR: 5000 INJECTION, POWDER, FOR SOLUTION INTRAMUSCULAR at 09:56

## 2020-02-01 RX ADMIN — BACITRACIN SCH MLS/HR: 5000 INJECTION, POWDER, FOR SOLUTION INTRAMUSCULAR at 04:26

## 2020-02-01 RX ADMIN — OXYCODONE HYDROCHLORIDE AND ACETAMINOPHEN PRN TAB: 5; 325 TABLET ORAL at 09:55

## 2020-02-01 RX ADMIN — OXYCODONE HYDROCHLORIDE AND ACETAMINOPHEN PRN TAB: 5; 325 TABLET ORAL at 05:34

## 2020-02-01 NOTE — NUR
Pt was given discharge instructions , follow up information and teaching. No new 
prescriptions were given. All belongings left with pt at discharge. Iv removed. Pt taken out 
via wheelchair to familys vehicle. Family will drive her to return home with self care. Pt 
is stable. Alert x4. Agreed to discharge plan, receptive to teaching. Pancreatitis 
educational handout given. PAT team provided more resources. Pt left at [id number]

## 2020-02-01 NOTE — PDOC
[employer] PROGRESS NOTE


Chief Complaint


Chief Complaint


Pancreatitis


Alcohol issues


Anemia


History of menorrhagia


Fibroid uterus - off provera


Hypomagnesemia - 1.4 - replaced





History of Present Illness


History of Present Illness


[id number]


Patient seen and examined


Chart reviewed


Discussed with RN


Patient still complaining of vertigo





Vitals/I&O


Vitals/I&O:





                                   Vital Signs








  Date Time  Temp Pulse Resp B/P (MAP) Pulse Ox O2 Delivery O2 Flow Rate FiO2


 


2/1/20 12:23     95 Room Air  


 


2/1/20 11:00 97.7 84 18 145/91 (109)    





 97.7       














                                    I & O   


 


 1/31/20 1/31/20 2/1/20





 15:00 23:00 07:00


 


Intake Total  1800 ml 1210 ml


 


Balance  1800 ml 1210 ml











Physical Exam


General:  Alert, Oriented X3, Cooperative, No acute distress


Heart:  Regular rate, Normal S1, Normal S2


Lungs:  Clear


Abdomen:  Normal bowel sounds, Soft, Other (tenderness, epig area, no rebound)


Extremities:  No clubbing, No cyanosis, No edema, Normal pulses, No 

tenderness/swelling


Skin:  No rashes, No breakdown, No significant lesion





Labs


Labs:





Laboratory Tests








Test


 2/1/20


04:21


 


Hemoglobin


 9.4 g/dL


(12.0-15.5)


 


Hematocrit


 29.5 %


(36.0-47.0)


 


Mean Corpuscular Hemoglobin


Concent 32 g/dL


(31-37)


 


Magnesium Level


 1.6 mg/dL


(1.8-2.4)


 


Lipase


 303 U/L


()











Review of Systems


Review of Systems:


Complains of vertigo complains of hunger





Assessment and Plan


Assessmemt and Plan


Problems


Medical Problems:


(1) Acute pancreatitis


Status: Acute  





(2) Nausea & vomiting


Status: Acute  





Pancreatitis


Alcohol issues


Anemia


History of menorrhagia


Fibroid uterus - off provera


Hypomagnesemia - 1.4 - replaced





Plan


Hope to advance diet


When necessary Zofran


Trend labs


GI following


DVT prophylaxis


Full code


Discharge once she is able to tolerate her diet and the vertigo resolves





Comment


Review of Relevant


I have reviewed the following items rosa maria (where applicable) has been applied.


Medications:





Current Medications








 Medications


  (Trade)  Dose


 Ordered  Sig/Heena


 Route


 PRN Reason  Start Time


 Stop Time Status Last Admin


Dose Admin


 


 Magnesium Sulfate  50 ml @ 25


 mls/hr  1X  ONCE


 IV


   1/31/20 14:30


 1/31/20 16:29 DC 1/31/20 14:36














Hemodynamically unstable?:  No


Is patient in severe pain?:  No


Is NPO status required?:  No











HENNA CANTU III DO            Feb 1, 2020 12:38